# Patient Record
(demographics unavailable — no encounter records)

---

## 2024-11-15 NOTE — HISTORY OF PRESENT ILLNESS
[de-identified] : The patient is a 74 year old right hand dominant female who presents today for a R knee follow up Date of Injury/Onset: Chronic pain  Pain:  At Rest: 5/10  With Activity:  3/10  Mechanism of injury: Chronic knee pain - hx of arthritis This is not a Work Related Injury being treated under Worker's Compensation. This is not an athletic injury occurring associated with an interscholastic or organized sports team. Quality of symptoms: aching medial and superior knee pain Improves with: rest, ice Worse with: prolonged sitting to standing Treatment/Imaging/Studies Since Last Visit:  R knee Gel one 11/3/22 	Reports Available For Review Today: none Out of work/sport: [No], since [retired] School/Sport/Position/Occupation: retired Changes since last visit: patient reports that she had good relief from the gel injection she had in 11/2022. Over the last 6 months the pain started to gradually return.   Additional Information: [None]

## 2024-11-15 NOTE — IMAGING
[Right] : right knee [de-identified] : The patient is a well appearing 74 year old female of their stated age.  Patient ambulates with a normal gait.  Negative straight leg raise bilateral   Effected Knee: RIGHT                        	  ROM:  0-130 degrees  Lachman: Negative  Pivot Shift: Negative  Anterior Drawer: Negative  Posterior Drawer / Sag:Negative  Varus Stress 0 degrees: Stable  Varus Stress 30 degrees: Stable  Valgus Stress 0 degrees: Stable  Valgus Stress 30 degrees: Stable  Medial Adonis: Negative  Lateral Adonis: Negative  Patella Glide: 2+  Patella Apprehension: Negative  Patella Grind: +  Palpation:  Medial Joint Line: TENDER Lateral Joint Line: TENDER Medial Collateral Ligament: Nontender  Lateral Collateral Ligament/PLC: Nontender  Distal Femur: Nontender  Proximal Tibia: Nontender  Tibial Tubercle: Nontender  Distal Pole Patella: Nontender  Quadriceps Tendon: Nontender &  Intact  Patella Tendon: Nontender &  Intact  Medial Distal Hamstring/PES: Nontender  Lateral Distal Hamstring: Nontender & Stable  Iliotibial Band: Nontender  Medial Patellofemoral Ligament: Nontender  Adductor: Nontender  Proximal GSC-Plantaris: Nontender  Calf: Supple & Nontender   Inspection:  Deformity: No  Erythema: No  Ecchymosis: No  Abrasions: No  Effusion: MILD  Prepatella Bursitis: No  Neurologic Exam:  Sensation L4-S1: Grossly Intact  Motor Exam:  Quadriceps: 5 out of 5  Hamstrings: 5 out of 5  EHL: 5 out of 5  FHL: 5 out of 5  TA: 5 out of 5  GS: 5 out of 5  Circulatory/Pulses:  Dorsalis Pedis: 2+  Posterior Tibialis: 2+  Additional Pertinent Findings: None   Contralateral Knee:                           	  ROM: 0-145 degrees  Other Pertinent Findings: None   Assessment: The patient is a 74 year old female with right knee pain and radiographic and physical exam findings consistent with tricompartmental OA    The patient's condition is chronic  Documents/Results Reviewed Today: X-Ray right knee  Tests/Studies Independently Interpreted Today: X-Ray right knee reveals evidence of tricompartmental OA, narrowing of both joint compartments Pertinent findings include:  medial joint line tenderness, lateral joint line tenderness, PF Crepitus, 0-130 ROM Confounding medical conditions/concerns: None  Plan: Discussed treatment options for the patient's tricompartmental OA. The patient is aware and understands that if she fails all conservative treatment modalities, she should consider a total knee arthroplasty. Discussed Visco supplementation Gel-One  with the patient today. We will submit for authorization today. Discussed appropriate use of OTC anti-inflammatories as needed for pain, inflammation, and discomfort - use as directed and take with food. Tests Ordered: None  Prescription Medications Ordered: Discussed appropriate use of OTC anti-inflammatories and analgesics (including but not limited to Aleve, Advil, Tylenol, Motrin, Ibuprofen, Voltaren gel, etc.) Braces/DME Ordered: None  Activity/Work/Sports Status: As tolerated  Additional Instructions: None Follow-Up: for Gel-one Injection  The patient's current medication management of their orthopedic diagnosis was reviewed today: (1) We discussed a comprehensive treatment plan that included possible pharmaceutical management involving the use of prescription strength medications including but not limited to options such as oral Naprosyn 500mg BID, once daily Meloxicam 15 mg, or 500-650 mg Tylenol versus over the counter oral medications and topical prescription NSAID Pennsaid vs over the counter Voltaren gel. (2) There is a moderate risk of morbidity with further treatment, especially from use of prescription strength medications and possible side effects of these medications which include upset stomach with oral medications, skin reactions to topical medications and cardiac/renal issues with long term use. (3) I recommended that the patient follow-up with their medical physician to discuss any significant specific potential issues with long term medication use such as interactions with current medications or with exacerbation of underlying medical comorbidities. (4) The benefits and risks associated with use of injectable, oral or topical, prescription and over the counter anti-inflammatory medications were discussed with the patient. The patient voiced understanding of the risks including but not limited to bleeding, stroke, kidney dysfunction, heart disease, and were referred to the black box warning label for further information.  Kaykay BUENO attest that this documentation has been prepared under the direction and in the presence of Provider Dr. Jeff Patel.  The documentation recorded by the scribe accurately reflects the services IDr. Jeff, personally performed and the decisions made by me.  [FreeTextEntry9] : X-Ray right knee reveals evidence of tricompartmental OA, narrowing of both joint compartments

## 2024-11-15 NOTE — HISTORY OF PRESENT ILLNESS
[de-identified] : The patient is a 74 year old right hand dominant female who presents today for a R knee follow up Date of Injury/Onset: Chronic pain  Pain:  At Rest: 5/10  With Activity:  3/10  Mechanism of injury: Chronic knee pain - hx of arthritis This is not a Work Related Injury being treated under Worker's Compensation. This is not an athletic injury occurring associated with an interscholastic or organized sports team. Quality of symptoms: aching medial and superior knee pain Improves with: rest, ice Worse with: prolonged sitting to standing Treatment/Imaging/Studies Since Last Visit:  R knee Gel one 11/3/22 	Reports Available For Review Today: none Out of work/sport: [No], since [retired] School/Sport/Position/Occupation: retired Changes since last visit: patient reports that she had good relief from the gel injection she had in 11/2022. Over the last 6 months the pain started to gradually return.   Additional Information: [None]

## 2024-11-15 NOTE — IMAGING
[Right] : right knee [de-identified] : The patient is a well appearing 74 year old female of their stated age.  Patient ambulates with a normal gait.  Negative straight leg raise bilateral   Effected Knee: RIGHT                        	  ROM:  0-130 degrees  Lachman: Negative  Pivot Shift: Negative  Anterior Drawer: Negative  Posterior Drawer / Sag:Negative  Varus Stress 0 degrees: Stable  Varus Stress 30 degrees: Stable  Valgus Stress 0 degrees: Stable  Valgus Stress 30 degrees: Stable  Medial Adonis: Negative  Lateral Adonis: Negative  Patella Glide: 2+  Patella Apprehension: Negative  Patella Grind: +  Palpation:  Medial Joint Line: TENDER Lateral Joint Line: TENDER Medial Collateral Ligament: Nontender  Lateral Collateral Ligament/PLC: Nontender  Distal Femur: Nontender  Proximal Tibia: Nontender  Tibial Tubercle: Nontender  Distal Pole Patella: Nontender  Quadriceps Tendon: Nontender &  Intact  Patella Tendon: Nontender &  Intact  Medial Distal Hamstring/PES: Nontender  Lateral Distal Hamstring: Nontender & Stable  Iliotibial Band: Nontender  Medial Patellofemoral Ligament: Nontender  Adductor: Nontender  Proximal GSC-Plantaris: Nontender  Calf: Supple & Nontender   Inspection:  Deformity: No  Erythema: No  Ecchymosis: No  Abrasions: No  Effusion: MILD  Prepatella Bursitis: No  Neurologic Exam:  Sensation L4-S1: Grossly Intact  Motor Exam:  Quadriceps: 5 out of 5  Hamstrings: 5 out of 5  EHL: 5 out of 5  FHL: 5 out of 5  TA: 5 out of 5  GS: 5 out of 5  Circulatory/Pulses:  Dorsalis Pedis: 2+  Posterior Tibialis: 2+  Additional Pertinent Findings: None   Contralateral Knee:                           	  ROM: 0-145 degrees  Other Pertinent Findings: None   Assessment: The patient is a 74 year old female with right knee pain and radiographic and physical exam findings consistent with tricompartmental OA    The patient's condition is chronic  Documents/Results Reviewed Today: X-Ray right knee  Tests/Studies Independently Interpreted Today: X-Ray right knee reveals evidence of tricompartmental OA, narrowing of both joint compartments Pertinent findings include:  medial joint line tenderness, lateral joint line tenderness, PF Crepitus, 0-130 ROM Confounding medical conditions/concerns: None  Plan: Discussed treatment options for the patient's tricompartmental OA. The patient is aware and understands that if she fails all conservative treatment modalities, she should consider a total knee arthroplasty. Discussed Visco supplementation Gel-One  with the patient today. We will submit for authorization today. Discussed appropriate use of OTC anti-inflammatories as needed for pain, inflammation, and discomfort - use as directed and take with food. Tests Ordered: None  Prescription Medications Ordered: Discussed appropriate use of OTC anti-inflammatories and analgesics (including but not limited to Aleve, Advil, Tylenol, Motrin, Ibuprofen, Voltaren gel, etc.) Braces/DME Ordered: None  Activity/Work/Sports Status: As tolerated  Additional Instructions: None Follow-Up: for Gel-one Injection  The patient's current medication management of their orthopedic diagnosis was reviewed today: (1) We discussed a comprehensive treatment plan that included possible pharmaceutical management involving the use of prescription strength medications including but not limited to options such as oral Naprosyn 500mg BID, once daily Meloxicam 15 mg, or 500-650 mg Tylenol versus over the counter oral medications and topical prescription NSAID Pennsaid vs over the counter Voltaren gel. (2) There is a moderate risk of morbidity with further treatment, especially from use of prescription strength medications and possible side effects of these medications which include upset stomach with oral medications, skin reactions to topical medications and cardiac/renal issues with long term use. (3) I recommended that the patient follow-up with their medical physician to discuss any significant specific potential issues with long term medication use such as interactions with current medications or with exacerbation of underlying medical comorbidities. (4) The benefits and risks associated with use of injectable, oral or topical, prescription and over the counter anti-inflammatory medications were discussed with the patient. The patient voiced understanding of the risks including but not limited to bleeding, stroke, kidney dysfunction, heart disease, and were referred to the black box warning label for further information.  Kaykay BUENO attest that this documentation has been prepared under the direction and in the presence of Provider Dr. Jeff Patel.  The documentation recorded by the scribe accurately reflects the services IDr. Jeff, personally performed and the decisions made by me.  [FreeTextEntry9] : X-Ray right knee reveals evidence of tricompartmental OA, narrowing of both joint compartments

## 2024-12-13 NOTE — IMAGING
[de-identified] : The patient is a well appearing 74 year old female of their stated age.  Patient ambulates with a normal gait.  Negative straight leg raise bilateral   Effected Knee: RIGHT                        	  ROM:  0-130 degrees  Lachman: Negative  Pivot Shift: Negative  Anterior Drawer: Negative  Posterior Drawer / Sag:Negative  Varus Stress 0 degrees: Stable  Varus Stress 30 degrees: Stable  Valgus Stress 0 degrees: Stable  Valgus Stress 30 degrees: Stable  Medial Adonis: Negative  Lateral Adonis: Negative  Patella Glide: 2+  Patella Apprehension: Negative  Patella Grind: +  Palpation:  Medial Joint Line: TENDER Lateral Joint Line: TENDER Medial Collateral Ligament: Nontender  Lateral Collateral Ligament/PLC: Nontender  Distal Femur: Nontender  Proximal Tibia: Nontender  Tibial Tubercle: Nontender  Distal Pole Patella: Nontender  Quadriceps Tendon: Nontender &  Intact  Patella Tendon: Nontender &  Intact  Medial Distal Hamstring/PES: Nontender  Lateral Distal Hamstring: Nontender & Stable  Iliotibial Band: Nontender  Medial Patellofemoral Ligament: Nontender  Adductor: Nontender  Proximal GSC-Plantaris: Nontender  Calf: Supple & Nontender   Inspection:  Deformity: No  Erythema: No  Ecchymosis: No  Abrasions: No  Effusion: MILD  Prepatella Bursitis: No  Neurologic Exam:  Sensation L4-S1: Grossly Intact  Motor Exam:  Quadriceps: 5 out of 5  Hamstrings: 5 out of 5  EHL: 5 out of 5  FHL: 5 out of 5  TA: 5 out of 5  GS: 5 out of 5  Circulatory/Pulses:  Dorsalis Pedis: 2+  Posterior Tibialis: 2+  Additional Pertinent Findings: None   Contralateral Knee:                           	  ROM: 0-145 degrees  Other Pertinent Findings: None   Assessment: The patient is a 74 year old female with right knee pain and radiographic and physical exam findings consistent with tricompartmental OA    The patient's condition is chronic  Documents/Results Reviewed Today: None   Tests/Studies Independently Interpreted Today: None  Pertinent findings include:  medial joint line tenderness, lateral joint line tenderness, PF Crepitus, 0-130 ROM Confounding medical conditions/concerns: None  Plan: Discussed treatment options for the patient's tricompartmental OA. The patient is aware and understands that if she fails all conservative treatment modalities, she should consider a total knee arthroplasty.  Discussed appropriate use of OTC anti-inflammatories as needed for pain, inflammation, and discomfort - use as directed and take with food. Patient received right knee gel one injection today.  The patient will follow up 6 months and 1 day to discuss ordering another injection if needed.  Tests Ordered: None  Prescription Medications Ordered: Discussed appropriate use of OTC anti-inflammatories and analgesics (including but not limited to Aleve, Advil, Tylenol, Motrin, Ibuprofen, Voltaren gel, etc.) Braces/DME Ordered: None  Activity/Work/Sports Status: As tolerated  Additional Instructions: None Follow-Up: 6 months   Procedure Note: Musculoskeletal Injection   Diagnosis: Right knee OA Procedure: Right knee, superolateral, Gel One   Indication: The patient has had persistent pain despite conservative treatment.  Risks, benefits and alternatives to procedure were discussed; all questions were answered to the patient's apparent satisfaction and informed consent obtained. Consent form was signed and dated today.  The patient denied prior problems with local anesthetics, injectable cortisones, chicken allergy, coagulopathy and no relevant drug or preservative allergies or sensitivities.   The area of injection was prepared in a sterile fashion.  Prior to injection a 'Time Out' was conducted in accordance with [facility] policy and the site and nature of procedure verified with the patient.   Procedure: The injection and aspiration was carried out utilizing sterile technique from a superolateral arthroscopic portal position with needle placement under ultrasound guidance to improve accuracy and minimize risk to the patient and:    (X) Diagnostic ultrasound in the long and short axis revealed OA   0cc of clear synovial fluid was aspirated. The specimen: (X) appeared benign and was discarded ( ) was sent for Culture / Cell Count / Crystal analysis / [_].    Injection into the target area with care taken to aspirate frequently to minimize the risk of intravascular injection was performed with:    ( ) 1cc of Depomedrol (80mg/ml) ( ) 1cc of Dexamethasone (10mg/ml) ( ) 1cc of Toradol (30mg/ml) ( ) 9cc of 0.5% Bupivacaine (X) 5cc of 1% Lidocaine ( ) 5cc of 32mg Zilretta, prepared and diluted per  instructions ( ) 2 cc of Hylan G-F 20 (Synvisc) 16mg/2ml ( ) 6 cc of Hylan G-F 20 (SynvisoOne) 16mg/2ml ( ) 2cc of Euflexxa ( ) 2cc of Orthovisc (X) 2cc of GelOne ( ) 3cc of Durolane (20mg/ml)    Patient tolerated the procedure well and direct pressure was applied for hemostasis. The patient was reminded of potential post-injection risks including, but not limited to, delayed hypersensitivity reactions and/or infection.  The patient verified that they had the office and the Emergency Room's contact information if any problems should arise.  After several minutes, the patient informed me that they felt fine and was released from the office.  The patient's current medication management of their orthopedic diagnosis was reviewed today: (1) We discussed a comprehensive treatment plan that included possible pharmaceutical management involving the use of prescription strength medications including but not limited to options such as oral Naprosyn 500mg BID, once daily Meloxicam 15 mg, or 500-650 mg Tylenol versus over the counter oral medications and topical prescription NSAID Pennsaid vs over the counter Voltaren gel. (2) There is a moderate risk of morbidity with further treatment, especially from use of prescription strength medications and possible side effects of these medications which include upset stomach with oral medications, skin reactions to topical medications and cardiac/renal issues with long term use. (3) I recommended that the patient follow-up with their medical physician to discuss any significant specific potential issues with long term medication use such as interactions with current medications or with exacerbation of underlying medical comorbidities. (4) The benefits and risks associated with use of injectable, oral or topical, prescription and over the counter anti-inflammatory medications were discussed with the patient. The patient voiced understanding of the risks including but not limited to bleeding, stroke, kidney dysfunction, heart disease, and were referred to the black box warning label for further information.  IKaykay attest that this documentation has been prepared under the direction and in the presence of Hieu Schmidt PA-C.   The documentation recorded by the scribe accurately reflects the service XIMENA Schmidt PA-C personally performed and the decisions made by me.

## 2024-12-13 NOTE — HISTORY OF PRESENT ILLNESS
[de-identified] : The patient is a 74 year old right hand dominant female who presents today for a R knee follow up Date of Injury/Onset: Chronic pain  Pain:  At Rest: 5/10  With Activity:  3/10  Mechanism of injury: Chronic knee pain - hx of arthritis This is not a Work Related Injury being treated under Worker's Compensation. This is not an athletic injury occurring associated with an interscholastic or organized sports team. Quality of symptoms: aching medial and superior knee pain Improves with: rest, ice Worse with: prolonged sitting to standing Treatment/Imaging/Studies Since Last Visit: none 	Reports Available For Review Today: none Out of work/sport: [No], since [retired] School/Sport/Position/Occupation: retired Changes since last visit: Knee pain persists. Interested in possible visco injection today.  Additional Information: [None]

## 2025-01-02 NOTE — HISTORY OF PRESENT ILLNESS
[de-identified] : The patient is a 74 year old right hand dominant female who presents today complaining of left knee. Date of Injury/Onset: 9/2024 Pain:    At Rest: 2/10             With Activity:  8/10 Mechanism of injury: gradual onset, worsening just before Kat while pt was bowling This is NPT a Work Related Injury being treated under Worker's Compensation. This is NOT an athletic injury occurring associated with an interscholastic or organized sports team. Quality of symptoms: sharp medial knee pain, instability, denies n/t  Improves with: rest, NSAIDs Worse with: sleeping, walking, stairs down > up Prior Treatment: None Prior Imaging: None Out of work/sport: N/A School/Sport/Position/Occupation: retired  Additional Information: lt knee scope by Dr. Patel 10-12 years ago

## 2025-01-02 NOTE — IMAGING
[Left] : left knee [de-identified] : The patient is a well appearing 74 year old female of their stated age. Patient ambulates with a normal gait. Negative straight leg raise bilateral   Effected Knee:  LEFT ROM:  0-130 degrees Lachman: Negative Pivot Shift: Negative Anterior Drawer: Negative Posterior Drawer / Sag: Negative Varus Stress 0 degrees: INSTABLITY  Varus Stress 30 degrees: INISTABILITY Valgus Stress 0 degrees: INSTABILITY Valgus Stress 30 degrees: INSTABILITY Medial Adonis: Negative Lateral Adonis: Negative Patella Glide: 2+ Patella Apprehension: Negative Patella Grind: Negative   Palpation: Medial Joint Line: TENDER  Lateral Joint Line: Nontender Medial Collateral Ligament: Nontender Lateral Collateral Ligament/PLC: Nontender Distal Femur: Nontender Proximal Tibia: Nontender Tibial Tubercle: Nontender Distal Pole Patella: Nontender Quadriceps Tendon: Nontender &  Intact Patella Tendon: Nontender &  Intact Medial Femoral Condyle: TENDER  Tibial plateau: TENDER  Medial Distal Hamstring/PES: Nontender Lateral Distal Hamstring: Nontender & Stable Iliotibial Band: Nontender Medial Patellofemoral Ligament: Nontender Adductor: Nontender Proximal GSC-Plantaris: Nontender Calf: Supple & Nontender   Inspection: Deformity: No Erythema: No Ecchymosis: No Abrasions: No Effusion: No Prepatella Bursitis: No Neurologic Exam: Sensation L4-S1: Grossly Intact Motor Exam: Quadriceps: 5 out of 5 Hamstrings: 5 out of 5 EHL: 5 out of 5 FHL: 5 out of 5 TA: 5 out of 5 GS: 5 out of 5 Circulatory/Pulses: Dorsalis Pedis: 2+ Posterior Tibialis: 2+ Additional Pertinent Findings: None     Contralateral Knee:                       ROM: 0-145 degrees Other Pertinent Findings: None   Assessment: The patient is a 74-year-old female with Left knee pain and radiographic and physical exam findings consistent with OA, The patient's condition is acute Documents/Results Reviewed Today: X-Ray left knee Tests/Studies Independently Interpreted Today: X-Ray left knee reveals evidence of advanced medial joint OA, patellofemoral OA, Pertinent findings include: 0-130, patellofemoral crepitus, varus and valgus instability, MJLT, Tender tibial plateau, Tender MFC,  Confounding medical conditions/concerns: Left knee scope by Dr. Patel 10-12 years ago     Plan: Discussed treatment options for the patient's OA operative vs non-operative. She will start Physical Therapy, HEP and stretching. The patient is aware and understands that if she fails all conservative treatment modalities, she should consider a total knee arthroplasty.  In the interim, we will focus on conservative management of arthritic related pain. Discussed treatment options in the form of injections to aid in pain, inflammation, and discomfort. Patient elected to receive left knee 9/1 CSI. Advised patient to rest and ice the area as tolerated. Discussed with patient if she finds relief from corticosteroid injection, we can further discuss Visco supplementation.  Tests Ordered: None Prescription Medications Ordered: Discussed appropriate use of OTC anti-inflammatories and analgesics (including but not limited to Aleve, Advil, Tylenol, Motrin, Ibuprofen, Voltaren gel, etc.) Braces/DME Ordered: None Activity/Work/Sports Status: None Additional Instructions: None Follow-Up:  4 weeks  Procedure Note: Musculoskeletal Injection Diagnosis: Left knee medial OA Procedure:  Left knee, superolateral, 9/1 CSI   Indication:  The patient has had persistent pain despite conservative treatment.  Risks, benefits and alternatives to procedure were discussed; all questions were answered to the patient's apparent satisfaction and informed consent obtained.  The patient denied prior problems with local anesthetics, injectable cortisones, chicken allergy, coagulopathy and no relevant drug or preservative allergies or sensitivities.   The area of injection was prepared in a sterile fashion.  Prior to injection a 'Time Out' was conducted in accordance with [facility] policy and the site and nature of procedure verified with the patient.   Procedure: The procedure was carried out utilizing sterile technique from a superolateral arthroscopic portal position.   0cc of clear synovial fluid was aspirated. The specimen: (X) appeared benign and was discarded ( ) was sent for Culture / Cell Count / Crystal analysis / [_].]    Injection into the target area with care taken to aspirate frequently to minimize the risk of intravascular injection was performed with: ( ) 1cc of Depomedrol (80mg/ml) (X) 1cc of Dexamethasone (10mg/ml) ( ) 1cc of Toradol (30mg/ml) (X) 9cc of 0.5% Bupivacaine ( ) 1cc of 1% Lidocaine ( ) 5cc of 32mg Zilretta, prepared and diluted per  instructions ( ) 2 cc of Hylan G-F 20 (Synvisc) 16mg/2ml ( ) 6 cc of Hylan G-F 20 (SynvisoOne) 16mg/2ml ( ) 2cc of Euflexxa ( ) 2cc of Orthovisc ( ) 2cc of GelOne ( ) 3cc of Durolane (20mg/ml)   Patient tolerated the procedure well and direct pressure was applied for hemostasis. The patient was reminded of potential post-injection risks including, but not limited to, delayed hypersensitivity reactions and/or infection.  The patient verified that they had the office and the Emergency Room's contact information if any problems should arise.  After several minutes, the patient informed me that they felt fine and was released from the office.  The patient's current medication management of their orthopedic diagnosis was reviewed today: The patient declined and/or was contraindicated for the recommended prescription medication Naprosyn and will use over the counter Advil, Alleve, Voltaren Gel or Tylenol as directed.  (1) We discussed a comprehensive treatment plan that included possible pharmaceutical management involving the use of prescription strength medications versus over the counter oral medications and topical prescription vs over the counter medications.  Based on our extensive discussion, the patient declined prescription medication and will use over the counter Advil, Aleve, Voltaren Gel or Tylenol as directed. (2) There is a moderate risk of morbidity with further treatment, especially from use of prescription strength medications and possible side effects of these medications which include upset stomach with oral medications, skin reactions to topical medications and cardiac/renal issues with long term use. (3) I recommended that the patient follow-up with their medical physician to discuss any significant specific potential issues with long term medication use such as interactions with current medications or with exacerbation of underlying medical comorbidities. (4) The benefits and risks associated with use of injectable, oral or topical, prescription and over the counter anti-inflammatory medications were discussed with the patient. The patient voiced understanding of the risks including but not limited to bleeding, stroke, kidney dysfunction, heart disease, and were referred to the black box warning label for further information.  Kaykay BUENO attest that this documentation has been prepared under the direction and in the presence of Provider Dr. Jeff Patel.   The documentation recorded by the scribe accurately reflects the services IDr. Jeff, personally performed and the decisions made by me. [FreeTextEntry9] :  X-Ray left knee reveals evidence of advanced medial joint OA, patellofemoral OA,

## 2025-02-16 NOTE — IMAGING
[Left] : left knee [de-identified] : The patient is a well appearing 74 year old female of their stated age. Patient ambulates with a normal gait. Negative straight leg raise bilateral   Effected Knee:  LEFT ROM:  0-130 degrees Lachman: Negative Pivot Shift: Negative Anterior Drawer: Negative Posterior Drawer / Sag: Negative Varus Stress 0 degrees: INSTABLITY  Varus Stress 30 degrees: INISTABILITY Valgus Stress 0 degrees: INSTABILITY Valgus Stress 30 degrees: INSTABILITY Medial Adonis: Negative Lateral Adonis: Negative Patella Glide: 2+ Patella Apprehension: Negative Patella Grind: Negative   Palpation: Medial Joint Line: TENDER  Lateral Joint Line: Nontender Medial Collateral Ligament: Nontender Lateral Collateral Ligament/PLC: Nontender Distal Femur: Nontender Proximal Tibia: Nontender Tibial Tubercle: Nontender Distal Pole Patella: Nontender Quadriceps Tendon: Nontender &  Intact Patella Tendon: Nontender &  Intact Medial Femoral Condyle: TENDER  Tibial plateau: TENDER  Medial Distal Hamstring/PES: Nontender Lateral Distal Hamstring: Nontender & Stable Iliotibial Band: Nontender Medial Patellofemoral Ligament: Nontender Adductor: Nontender Proximal GSC-Plantaris: Nontender Calf: Supple & Nontender   Inspection: Deformity: No Erythema: No Ecchymosis: No Abrasions: No Effusion: No Prepatella Bursitis: No Neurologic Exam: Sensation L4-S1: Grossly Intact Motor Exam: Quadriceps: 5 out of 5 Hamstrings: 5 out of 5 EHL: 5 out of 5 FHL: 5 out of 5 TA: 5 out of 5 GS: 5 out of 5 Circulatory/Pulses: Dorsalis Pedis: 2+ Posterior Tibialis: 2+ Additional Pertinent Findings: None     Contralateral Knee:                       ROM: 0-145 degrees Other Pertinent Findings: None   Assessment: The patient is a 74-year-old female with Left knee pain and radiographic and physical exam findings consistent with OA, The patient's condition is acute Documents/Results Reviewed Today: None Tests/Studies Independently Interpreted Today: None Pertinent findings include: 0-130, patellofemoral crepitus, varus and valgus instability, MJLT, Tender tibial plateau, Tender MFC,  Confounding medical conditions/concerns: Left knee scope by Dr. Patel 10-12 years ago     Plan: Patient got relief from corticosteroid injection but has gotten relief in the past from Visco supplementation. Again, discussed treatment options for the patient's OA operative vs non-operative. Discussed Visco supplementation with the patient, she agrees we will submit for authorization today. She will continue Physical Therapy, HEP and stretching. The patient is aware and understands that if she fails all conservative treatment modalities, she should consider a total knee arthroplasty.  In the interim, we will focus on conservative management of arthritic related pain.  Tests Ordered: None Prescription Medications Ordered: Discussed appropriate use of OTC anti-inflammatories and analgesics (including but not limited to Aleve, Advil, Tylenol, Motrin, Ibuprofen, Voltaren gel, etc.) Braces/DME Ordered: None Activity/Work/Sports Status: None Additional Instructions: None Follow-Up:  for Visco injection  The patient's current medication management of their orthopedic diagnosis was reviewed today: The patient declined and/or was contraindicated for the recommended prescription medication Naprosyn and will use over the counter Advil, Alleve, Voltaren Gel or Tylenol as directed.  (1) We discussed a comprehensive treatment plan that included possible pharmaceutical management involving the use of prescription strength medications versus over the counter oral medications and topical prescription vs over the counter medications.  Based on our extensive discussion, the patient declined prescription medication and will use over the counter Advil, Aleve, Voltaren Gel or Tylenol as directed. (2) There is a moderate risk of morbidity with further treatment, especially from use of prescription strength medications and possible side effects of these medications which include upset stomach with oral medications, skin reactions to topical medications and cardiac/renal issues with long term use. (3) I recommended that the patient follow-up with their medical physician to discuss any significant specific potential issues with long term medication use such as interactions with current medications or with exacerbation of underlying medical comorbidities. (4) The benefits and risks associated with use of injectable, oral or topical, prescription and over the counter anti-inflammatory medications were discussed with the patient. The patient voiced understanding of the risks including but not limited to bleeding, stroke, kidney dysfunction, heart disease, and were referred to the black box warning label for further information.  Kaykay BUENO attest that this documentation has been prepared under the direction and in the presence of Provider Dr. Jeff Patel.  The documentation recorded by the scribe accurately reflects the services IDr. Jeff, personally performed and the decisions made by me.   [FreeTextEntry9] :  X-Ray left knee reveals evidence of advanced medial joint OA, patellofemoral OA,

## 2025-02-16 NOTE — IMAGING
[Left] : left knee [de-identified] : The patient is a well appearing 74 year old female of their stated age. Patient ambulates with a normal gait. Negative straight leg raise bilateral   Effected Knee:  LEFT ROM:  0-130 degrees Lachman: Negative Pivot Shift: Negative Anterior Drawer: Negative Posterior Drawer / Sag: Negative Varus Stress 0 degrees: INSTABLITY  Varus Stress 30 degrees: INISTABILITY Valgus Stress 0 degrees: INSTABILITY Valgus Stress 30 degrees: INSTABILITY Medial Adonis: Negative Lateral Adonis: Negative Patella Glide: 2+ Patella Apprehension: Negative Patella Grind: Negative   Palpation: Medial Joint Line: TENDER  Lateral Joint Line: Nontender Medial Collateral Ligament: Nontender Lateral Collateral Ligament/PLC: Nontender Distal Femur: Nontender Proximal Tibia: Nontender Tibial Tubercle: Nontender Distal Pole Patella: Nontender Quadriceps Tendon: Nontender &  Intact Patella Tendon: Nontender &  Intact Medial Femoral Condyle: TENDER  Tibial plateau: TENDER  Medial Distal Hamstring/PES: Nontender Lateral Distal Hamstring: Nontender & Stable Iliotibial Band: Nontender Medial Patellofemoral Ligament: Nontender Adductor: Nontender Proximal GSC-Plantaris: Nontender Calf: Supple & Nontender   Inspection: Deformity: No Erythema: No Ecchymosis: No Abrasions: No Effusion: No Prepatella Bursitis: No Neurologic Exam: Sensation L4-S1: Grossly Intact Motor Exam: Quadriceps: 5 out of 5 Hamstrings: 5 out of 5 EHL: 5 out of 5 FHL: 5 out of 5 TA: 5 out of 5 GS: 5 out of 5 Circulatory/Pulses: Dorsalis Pedis: 2+ Posterior Tibialis: 2+ Additional Pertinent Findings: None     Contralateral Knee:                       ROM: 0-145 degrees Other Pertinent Findings: None   Assessment: The patient is a 74-year-old female with Left knee pain and radiographic and physical exam findings consistent with OA, The patient's condition is acute Documents/Results Reviewed Today: None Tests/Studies Independently Interpreted Today: None Pertinent findings include: 0-130, patellofemoral crepitus, varus and valgus instability, MJLT, Tender tibial plateau, Tender MFC,  Confounding medical conditions/concerns: Left knee scope by Dr. Patel 10-12 years ago     Plan: Patient got relief from corticosteroid injection but has gotten relief in the past from Visco supplementation. Again, discussed treatment options for the patient's OA operative vs non-operative. Discussed Visco supplementation with the patient, she agrees we will submit for authorization today. She will continue Physical Therapy, HEP and stretching. The patient is aware and understands that if she fails all conservative treatment modalities, she should consider a total knee arthroplasty.  In the interim, we will focus on conservative management of arthritic related pain.  Tests Ordered: None Prescription Medications Ordered: Discussed appropriate use of OTC anti-inflammatories and analgesics (including but not limited to Aleve, Advil, Tylenol, Motrin, Ibuprofen, Voltaren gel, etc.) Braces/DME Ordered: None Activity/Work/Sports Status: None Additional Instructions: None Follow-Up:  for Visco injection  The patient's current medication management of their orthopedic diagnosis was reviewed today: The patient declined and/or was contraindicated for the recommended prescription medication Naprosyn and will use over the counter Advil, Alleve, Voltaren Gel or Tylenol as directed.  (1) We discussed a comprehensive treatment plan that included possible pharmaceutical management involving the use of prescription strength medications versus over the counter oral medications and topical prescription vs over the counter medications.  Based on our extensive discussion, the patient declined prescription medication and will use over the counter Advil, Aleve, Voltaren Gel or Tylenol as directed. (2) There is a moderate risk of morbidity with further treatment, especially from use of prescription strength medications and possible side effects of these medications which include upset stomach with oral medications, skin reactions to topical medications and cardiac/renal issues with long term use. (3) I recommended that the patient follow-up with their medical physician to discuss any significant specific potential issues with long term medication use such as interactions with current medications or with exacerbation of underlying medical comorbidities. (4) The benefits and risks associated with use of injectable, oral or topical, prescription and over the counter anti-inflammatory medications were discussed with the patient. The patient voiced understanding of the risks including but not limited to bleeding, stroke, kidney dysfunction, heart disease, and were referred to the black box warning label for further information.  Kaykay BUENO attest that this documentation has been prepared under the direction and in the presence of Provider Dr. Jeff Patel.  The documentation recorded by the scribe accurately reflects the services IDr. Jeff, personally performed and the decisions made by me.   [FreeTextEntry9] :  X-Ray left knee reveals evidence of advanced medial joint OA, patellofemoral OA,

## 2025-02-16 NOTE — HISTORY OF PRESENT ILLNESS
[de-identified] : The patient is a 74 year old right hand dominant female who presents today complaining of left knee. Date of Injury/Onset: 9/2024 Pain:    At Rest: 0/10             With Activity:  1/10 Mechanism of injury: gradual onset, worsening just before [place] while pt was bowling This is NPT a Work Related Injury being treated under Worker's Compensation. This is NOT an athletic injury occurring associated with an interscholastic or organized sports team. Quality of symptoms: sharp medial knee pain, instability, denies n/t  Improves with: rest, NSAIDs Worse with: sleeping, walking, stairs down > up Treatment/Imaging/Studies Since Last Visit: CSI 1/2/25, PT @ [facility] 	Reports Available For Review Today: none Changes since last visit: Patient reports overall dec in pain since CSI last visit. Reports onset of RT hip bursitis that is causing her pain going down stairs, but reports LT knee pain has since resolved.  Out of work/sport: retired  School/Sport/Position/Occupation: retired  Additional Information: lt knee scope by Dr. Patel 10-12 years ago

## 2025-02-16 NOTE — HISTORY OF PRESENT ILLNESS
[de-identified] : The patient is a 74 year old right hand dominant female who presents today complaining of left knee. Date of Injury/Onset: 9/2024 Pain:    At Rest: 0/10             With Activity:  1/10 Mechanism of injury: gradual onset, worsening just before [place] while pt was bowling This is NPT a Work Related Injury being treated under Worker's Compensation. This is NOT an athletic injury occurring associated with an interscholastic or organized sports team. Quality of symptoms: sharp medial knee pain, instability, denies n/t  Improves with: rest, NSAIDs Worse with: sleeping, walking, stairs down > up Treatment/Imaging/Studies Since Last Visit: CSI 1/2/25, PT @ [facility] 	Reports Available For Review Today: none Changes since last visit: Patient reports overall dec in pain since CSI last visit. Reports onset of RT hip bursitis that is causing her pain going down stairs, but reports LT knee pain has since resolved.  Out of work/sport: retired  School/Sport/Position/Occupation: retired  Additional Information: lt knee scope by Dr. Patel 10-12 years ago

## 2025-03-06 NOTE — IMAGING
[de-identified] : The patient is a well appearing 74 year old female of their stated age. Patient ambulates with a normal gait. Negative straight leg raise bilateral   Effected Knee:  LEFT ROM:  0-130 degrees Lachman: Negative Pivot Shift: Negative Anterior Drawer: Negative Posterior Drawer / Sag: Negative Varus Stress 0 degrees: INSTABLITY  Varus Stress 30 degrees: INISTABILITY Valgus Stress 0 degrees: INSTABILITY Valgus Stress 30 degrees: INSTABILITY Medial Adonis: Negative Lateral Adonis: Negative Patella Glide: 2+ Patella Apprehension: Negative Patella Grind: Negative   Palpation: Medial Joint Line: TENDER  Lateral Joint Line: Nontender Medial Collateral Ligament: Nontender Lateral Collateral Ligament/PLC: Nontender Distal Femur: Nontender Proximal Tibia: Nontender Tibial Tubercle: Nontender Distal Pole Patella: Nontender Quadriceps Tendon: Nontender &  Intact Patella Tendon: Nontender &  Intact Medial Femoral Condyle: TENDER  Tibial plateau: TENDER  Medial Distal Hamstring/PES: Nontender Lateral Distal Hamstring: Nontender & Stable Iliotibial Band: Nontender Medial Patellofemoral Ligament: Nontender Adductor: Nontender Proximal GSC-Plantaris: Nontender Calf: Supple & Nontender   Inspection: Deformity: No Erythema: No Ecchymosis: No Abrasions: No Effusion: No Prepatella Bursitis: No Neurologic Exam: Sensation L4-S1: Grossly Intact Motor Exam: Quadriceps: 5 out of 5 Hamstrings: 5 out of 5 EHL: 5 out of 5 FHL: 5 out of 5 TA: 5 out of 5 GS: 5 out of 5 Circulatory/Pulses: Dorsalis Pedis: 2+ Posterior Tibialis: 2+ Additional Pertinent Findings: None     Contralateral Knee:                       ROM: 0-145 degrees Other Pertinent Findings: None   Assessment: The patient is a 74-year-old female with Left knee pain and radiographic and physical exam findings consistent with OA, The patient's condition is acute Documents/Results Reviewed Today: None Tests/Studies Independently Interpreted Today: None Pertinent findings include: 0-130, patellofemoral crepitus, varus and valgus instability, MJLT, Tender tibial plateau, Tender MFC,  Confounding medical conditions/concerns: Left knee scope by Dr. Patel 10-12 years ago     Plan:  Again, discussed treatment options for the patient's OA operative vs non-operative. She will continue Physical Therapy, HEP and stretching. The patient is aware and understands that if she fails all conservative treatment modalities, she should consider a total knee arthroplasty.  In the interim, we will focus on conservative management of arthritic related pain. Discussed treatment options in the form of injections to aid in pain, inflammation, and discomfort. Patient elected to receive Left knee GelOne.  Advised patient to rest and ice the area as tolerated. Tests Ordered: None Prescription Medications Ordered: Discussed appropriate use of OTC anti-inflammatories and analgesics (including but not limited to Aleve, Advil, Tylenol, Motrin, Ibuprofen, Voltaren gel, etc.) Braces/DME Ordered: None Activity/Work/Sports Status: None Additional Instructions: None Follow-Up:  6 months and 1 day   Procedure Note: Musculoskeletal Injection   Diagnosis: Left knee OA Procedure: Left knee, superolateral, Gel One   Indication: The patient has had persistent pain despite conservative treatment.  Risks, benefits and alternatives to procedure were discussed; all questions were answered to the patient's apparent satisfaction and informed consent obtained. Consent form was signed and dated today.  The patient denied prior problems with local anesthetics, injectable cortisones, chicken allergy, coagulopathy and no relevant drug or preservative allergies or sensitivities.   The area of injection was prepared in a sterile fashion.  Prior to injection a 'Time Out' was conducted in accordance with [facility] policy and the site and nature of procedure verified with the patient.   Procedure: The injection and aspiration was carried out utilizing sterile technique from a superolateral arthroscopic portal position with needle placement under ultrasound guidance to improve accuracy and minimize risk to the patient and:    (X) Diagnostic ultrasound in the long and short axis revealed OA   0cc of clear synovial fluid was aspirated. The specimen: (X) appeared benign and was discarded ( ) was sent for Culture / Cell Count / Crystal analysis / [_].    Injection into the target area with care taken to aspirate frequently to minimize the risk of intravascular injection was performed with:    ( ) 1cc of Depomedrol (80mg/ml) ( ) 1cc of Dexamethasone (10mg/ml) ( ) 1cc of Toradol (30mg/ml) ( ) 9cc of 0.5% Bupivacaine (X) 5cc of 1% Lidocaine ( ) 5cc of 32mg Zilretta, prepared and diluted per  instructions ( ) 2 cc of Hylan G-F 20 (Synvisc) 16mg/2ml ( ) 6 cc of Hylan G-F 20 (SynvisoOne) 16mg/2ml ( ) 2cc of Euflexxa ( ) 2cc of Orthovisc (X) 2cc of GelOne ( ) 3cc of Durolane (20mg/ml)    Patient tolerated the procedure well and direct pressure was applied for hemostasis. The patient was reminded of potential post-injection risks including, but not limited to, delayed hypersensitivity reactions and/or infection.  The patient verified that they had the office and the Emergency Room's contact information if any problems should arise.  After several minutes, the patient informed me that they felt fine and was released from the office.  The patient's current medication management of their orthopedic diagnosis was reviewed today: The patient declined and/or was contraindicated for the recommended prescription medication Naprosyn and will use over the counter Advil, Alleve, Voltaren Gel or Tylenol as directed.  (1) We discussed a comprehensive treatment plan that included possible pharmaceutical management involving the use of prescription strength medications versus over the counter oral medications and topical prescription vs over the counter medications.  Based on our extensive discussion, the patient declined prescription medication and will use over the counter Advil, Aleve, Voltaren Gel or Tylenol as directed. (2) There is a moderate risk of morbidity with further treatment, especially from use of prescription strength medications and possible side effects of these medications which include upset stomach with oral medications, skin reactions to topical medications and cardiac/renal issues with long term use. (3) I recommended that the patient follow-up with their medical physician to discuss any significant specific potential issues with long term medication use such as interactions with current medications or with exacerbation of underlying medical comorbidities. (4) The benefits and risks associated with use of injectable, oral or topical, prescription and over the counter anti-inflammatory medications were discussed with the patient. The patient voiced understanding of the risks including but not limited to bleeding, stroke, kidney dysfunction, heart disease, and were referred to the black box warning label for further information.  Kaykay BUENO attest that this documentation has been prepared under the direction and in the presence of Provider Dr. Jeff Patel.  The documentation recorded by the scribe accurately reflects the services IDr. Jeff, personally performed and the decisions made by me.

## 2025-03-06 NOTE — HISTORY OF PRESENT ILLNESS
[de-identified] : The patient is a 75 year old right hand dominant female who presents today complaining of left knee pain.  Date of Injury/Onset: 9/2024 Pain:    At Rest: 0/10             With Activity:  1/10 Mechanism of injury: gradual onset, worsening just before [place] while pt was bowling This is NPT a Work Related Injury being treated under Worker's Compensation. This is NOT an athletic injury occurring associated with an interscholastic or organized sports team. Quality of symptoms: sharp medial knee pain, instability, denies n/t  Improves with: rest, NSAIDs Worse with: sleeping, walking, stairs down > up Treatment/Imaging/Studies Since Last Visit: PT @ [facility] 	Reports Available For Review Today: none Out of work/sport: retired  School/Sport/Position/Occupation: retired  Changes since last visit: L knee pain persists. Interested in possible visco injection today.  Additional Information: lt knee scope by Dr. Patel 10-12 years ago

## 2025-05-11 NOTE — DISCUSSION/SUMMARY
[de-identified] : Patient will continue with home exercise protocol. We will have her return to outpatient physical therapy, focusing on core muscle strengthening range of motion and exercise-based rehab rehabilitation. If symptoms of age, she can follow up on here and basis with regard to her lower back. If symptoms persist, she will return to the office for further reevaluation. Cumulative encounter duration exceeded 30 minutes   Prior to appointment and during encounter with patient extensive medical records were reviewed including but not limited to, hospital records, outpatient records, imaging results, and lab data. During this appointment the patient was examined, diagnoses were discussed and explained in a face to face manner. In addition extensive time was spent reviewing aforementioned diagnostic studies. Counseling including abnormal image results, differential diagnoses, treatment options, risk and benefits, lifestyle changes, current condition, and current medications was performed. Patient's comments, questions, and concerns were addressed and patient verbalized understanding. Based on this patient's presentation at our office, which is an orthopedic spine surgeon's office, this patient inherently / intrinsically has a risk, however minute, of developing issues such as Cauda equina syndrome, bowel and bladder changes, or progression of motor or neurological deficits such as paralysis which may be permanent.    I, [Chema Bishop, PA], certify that the clinical information was reviewed with Dr. Solis, who was physically present in the office. He agreed with the above plan of care and was available for consultation as necessary during the office visit.

## 2025-05-11 NOTE — PHYSICAL EXAM
[Normal Coordination] : normal coordination [Normal DTR UE/LE] : normal DTR UE/LE  [Normal Sensation] : normal sensation [Normal Mood and Affect] : normal mood and affect [Oriented] : oriented [Able to Communicate] : able to communicate [Normal Skin] : normal skin [No Rash] : no rash [No Ulcers] : no ulcers [No Lesions] : no lesions [No obvious lymphadenopathy in areas examined] : no obvious lymphadenopathy in areas examined [Well Developed] : well developed [Well Nourished] : well nourished [Peripheral vascular exam is grossly normal] : peripheral vascular exam is grossly normal [No Respiratory Distress] : no respiratory distress [Lungs clear to auscultation bilaterally] : lungs clear to auscultation bilaterally [Normal Bowel Sounds] : normal bowel sounds [Non-Tender] : non-tender [No HSM] : no HSM [No Mass] : no mass [Extension] : extension [Biceps 2+] : biceps 2+ [Triceps 2+] : triceps 2+ [Brachioradialis 2+] : brachioradialis 2+ [] : negative Arias reflex [FreeTextEntry9] : ROM limited

## 2025-05-11 NOTE — HISTORY OF PRESENT ILLNESS
[de-identified] : 05/07/2025 - Patient returns to the office. She reports two weeks ago. She had exacerbation of lower back pain while attending a softball game for her grandchild. She felt that she got stuck in the chair after sitting for a long period of time. Not reporting any new symptoms in the lower extremities. No changes and numbness and tingling weakness. She recently underwent left the injection with success. At the present time she's not having any significant back pain. Some stiffness on the left side. She would like to revisit physical therapy. She's able to attend home spine conditioning program two times a week and exercise vigorously for one hour. No recent trauma or injury to lower back  Injury Details: The patient is a 75 year female who presents today complaining of low back nguyễn radiating down left leg  Date of Injury/Onset: 2 weeks ago Pain:    At Rest: 0-8/10  With Activity:  2/10  Mechanism of injury: after prolonged sitting Quality of symptoms:  sharp, shooting, tingling Improves with: Advil, mild activity Worse with: prolonged sitting Prior treatment: no Prior Imaging: no Additional Information:

## 2025-05-11 NOTE — DATA REVIEWED
[FreeTextEntry1] : On my interpretation of these images X-ray in the office 05/07/2025  grade one spondylolisthesis L4/5 with degenerative lumbar scoliosis. Approximately 24 degrees L3-L5. Large osteophytes on the concavity L2/3 L3/4  I stop paperwork reviewed Ortho progress notes reviewed PT progress notes reviewed